# Patient Record
(demographics unavailable — no encounter records)

---

## 2024-10-05 NOTE — PHYSICAL EXAM
[General Appearance - Well Developed] : well developed [General Appearance - Well Nourished] : well nourished [Edema] : no peripheral edema [] : no respiratory distress [Abdomen Soft] : soft [Urinary Bladder Findings] : the bladder was normal on palpation [Normal Station and Gait] : the gait and station were normal for the patient's age [No Focal Deficits] : no focal deficits [Oriented To Time, Place, And Person] : oriented to person, place, and time

## 2024-10-07 NOTE — HISTORY OF PRESENT ILLNESS
[FreeTextEntry1] : 50 yo male, with a "lump" on the testicle -- on exam: large right testicular mass PSA (oct 2023) =0.7 heavy smoker >40 years  August 2024: STMs - HCG=7 (elevated) / AFP WNL / LDH =321 elevated US scrotum: 7.3 cm x 4.6 cm x 6.4 cm sized heterogeneous mass that demonstrates intrinsic flow, concerning for malignancy. / left sided testicular microlithiasis.  CT chest abd pelvis w wo IV:  Aortic caval lymph node measuring up to 1.6 x 1.2 cm on series 3 image 204. This measured approximately 1.1 x 0.8 cm on series 2 image 67. In a patient with testicular cancer, metastatic disease cannot be excluded. PET/CT is recommended for further evaluation.  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  October 2024: post orchiectomy STM's: all back to WNL limits. 6-week follow-up CT abdomen/pelvis:  A lymph node on image 3-75 measures 1.0 x 0.9 cm. Multiple additional retroperitoneal lymph nodes appear smaller. Several prominent left inguinal lymph nodes appear grossly similar to the prior CT.

## 2024-10-07 NOTE — HISTORY OF PRESENT ILLNESS
[FreeTextEntry1] : 52 yo male, with a "lump" on the testicle -- on exam: large right testicular mass PSA (oct 2023) =0.7 heavy smoker >40 years  August 2024: STMs - HCG=7 (elevated) / AFP WNL / LDH =321 elevated US scrotum: 7.3 cm x 4.6 cm x 6.4 cm sized heterogeneous mass that demonstrates intrinsic flow, concerning for malignancy. / left sided testicular microlithiasis.  CT chest abd pelvis w wo IV:  Aortic caval lymph node measuring up to 1.6 x 1.2 cm on series 3 image 204. This measured approximately 1.1 x 0.8 cm on series 2 image 67. In a patient with testicular cancer, metastatic disease cannot be excluded. PET/CT is recommended for further evaluation.  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  October 2024: post orchiectomy STM's: all back to WNL limits. 6-week follow-up CT abdomen/pelvis:  A lymph node on image 3-75 measures 1.0 x 0.9 cm. Multiple additional retroperitoneal lymph nodes appear smaller. Several prominent left inguinal lymph nodes appear grossly similar to the prior CT.

## 2024-10-07 NOTE — ASSESSMENT
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 October 2024: post orchiectomy STM's: all back to WNL limits. 6-week follow-up CT abdomen/pelvis:  A lymph node on image 3-75 measures 1.0 x 0.9 cm. Multiple additional retroperitoneal lymph nodes appear smaller. Several prominent left inguinal lymph nodes appear grossly similar to the prior CT.  Plan All options thoroughly discussed with patient and his wife:  Observation / Surveillance STM's and imaging  30 Gy of dog-leg radiation tx  SEMS Trial open RPLND  All risk, benefits, pro's and con's have been thoroughly explained  Patient prefers to proceed with Open RPLND  Details regarding indications, risks, and benefits of the procedure were thoroughly explained to the patient. All images and labs were reviewed and explained thoroughly All the patient's questions were answered to the best of my ability.  will be done with co-surgeon Dr Kaila Torres.

## 2024-11-25 NOTE — HISTORY OF PRESENT ILLNESS
[FreeTextEntry1] : 52 yo male, with a "lump" on the testicle -- on exam: large right testicular mass PSA (oct 2023) =0.7 heavy smoker >40 years August 2024: STMs - HCG=7 (elevated) / AFP WNL / LDH =321 elevated US scrotum: 7.3 cm x 4.6 cm x 6.4 cm sized heterogeneous mass that demonstrates intrinsic flow, concerning for malignancy. / left sided testicular microlithiasis.  CT chest abd pelvis w wo IV:  Aortic caval lymph node measuring up to 1.6 x 1.2 cm on series 3 image 204. This measured approximately 1.1 x 0.8 cm on series 2 image 67. In a patient with testicular cancer, metastatic disease cannot be excluded. PET/CT is recommended for further evaluation.  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  October 2024: post orchiectomy STM's: all back to WNL limits. 6-week follow-up CT abdomen/pelvis:  A lymph node on image 3-75 measures 1.0 x 0.9 cm. Multiple additional retroperitoneal lymph nodes appear smaller. Several prominent left inguinal lymph nodes appear grossly similar to the prior CT.  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 all staples removed. skin well healed. staples removed. one area in skin close to umbilicus - left a staple there to wait for full healing

## 2024-11-25 NOTE — ASSESSMENT
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 October 2024: post orchiectomy STM's: all back to WNL limits. 6-week follow-up CT abdomen/pelvis:  A lymph node on image 3-75 measures 1.0 x 0.9 cm. Multiple additional retroperitoneal lymph nodes appear smaller. Several prominent left inguinal lymph nodes appear grossly similar to the prior CT. 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 all staples removed. skin well healed. staples removed. one area in skin close to umbilicus - left a staple there to wait for full healing  Plan repeat STM's in 6 months RTC 6 months

## 2025-05-21 NOTE — ASSESSMENT
[FreeTextEntry1] : May 2025: LH/AFP/HCG all WNL // doing great   Plan CT chest abd pelvis in 5 months PSA screening F/T RTC 5 months

## 2025-05-21 NOTE — HISTORY OF PRESENT ILLNESS
[FreeTextEntry1] : 50 yo male, with a "lump" on the testicle -- on exam: large right testicular mass PSA (oct 2023) =0.7 heavy smoker >40 years August 2024: STMs - HCG=7 (elevated) / AFP WNL / LDH =321 elevated US scrotum: 7.3 cm x 4.6 cm x 6.4 cm sized heterogeneous mass that demonstrates intrinsic flow, concerning for malignancy. / Left sided testicular microlithiasis. CT chest Abd pelvis w wo IV:  Aortic caval lymph node measuring up to 1.6 x 1.2 cm on series 3 image 204. This measured approximately 1.1 x 0.8 cm on series 2 image 67. In a patient with testicular cancer, metastatic disease cannot be excluded. PET/CT is recommended for further evaluation. 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  October 2024: post orchiectomy STM's: all back to WNL limits. 6-week follow-up CT abdomen/pelvis:  A lymph node on image 3-75 measures 1.0 x 0.9 cm. Multiple additional retroperitoneal lymph nodes appear smaller. Several prominent left inguinal lymph nodes appear grossly similar to the prior CT.  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 all staples removed. skin well healed. staples removed. one area in skin close to umbilicus - left a staple there to wait for full healing. May 2025: LH/AFP/HCG all WNL // doing great